# Patient Record
Sex: FEMALE | Race: WHITE | NOT HISPANIC OR LATINO | URBAN - METROPOLITAN AREA
[De-identification: names, ages, dates, MRNs, and addresses within clinical notes are randomized per-mention and may not be internally consistent; named-entity substitution may affect disease eponyms.]

---

## 2019-04-04 ENCOUNTER — EMERGENCY (EMERGENCY)
Facility: HOSPITAL | Age: 49
LOS: 1 days | Discharge: HOME | End: 2019-04-04
Attending: EMERGENCY MEDICINE
Payer: COMMERCIAL

## 2019-04-04 VITALS
HEART RATE: 68 BPM | OXYGEN SATURATION: 99 % | RESPIRATION RATE: 18 BRPM | WEIGHT: 132.94 LBS | HEIGHT: 66 IN | TEMPERATURE: 98 F

## 2019-04-04 LAB
ALBUMIN SERPL ELPH-MCNC: 4.8 G/DL — SIGNIFICANT CHANGE UP (ref 3.3–5)
ALP SERPL-CCNC: 78 U/L — SIGNIFICANT CHANGE UP (ref 40–120)
ALT FLD-CCNC: 16 U/L — SIGNIFICANT CHANGE UP (ref 10–45)
ANION GAP SERPL CALC-SCNC: 12 MMOL/L — SIGNIFICANT CHANGE UP (ref 5–17)
AST SERPL-CCNC: 19 U/L — SIGNIFICANT CHANGE UP (ref 10–40)
BASOPHILS # BLD AUTO: 0.1 K/UL — SIGNIFICANT CHANGE UP (ref 0–0.2)
BASOPHILS NFR BLD AUTO: 1 % — SIGNIFICANT CHANGE UP (ref 0–2)
BILIRUB SERPL-MCNC: 0.9 MG/DL — SIGNIFICANT CHANGE UP (ref 0.2–1.2)
BUN SERPL-MCNC: 11 MG/DL — SIGNIFICANT CHANGE UP (ref 7–23)
CALCIUM SERPL-MCNC: 10.1 MG/DL — SIGNIFICANT CHANGE UP (ref 8.4–10.5)
CHLORIDE SERPL-SCNC: 101 MMOL/L — SIGNIFICANT CHANGE UP (ref 96–108)
CO2 SERPL-SCNC: 29 MMOL/L — SIGNIFICANT CHANGE UP (ref 22–31)
CREAT SERPL-MCNC: 0.76 MG/DL — SIGNIFICANT CHANGE UP (ref 0.5–1.3)
EOSINOPHIL # BLD AUTO: 0.1 K/UL — SIGNIFICANT CHANGE UP (ref 0–0.5)
EOSINOPHIL NFR BLD AUTO: 1.7 % — SIGNIFICANT CHANGE UP (ref 0–6)
GLUCOSE SERPL-MCNC: 110 MG/DL — HIGH (ref 70–99)
HCG SERPL-ACNC: 5.4 MIU/ML — HIGH
HCT VFR BLD CALC: 47.1 % — HIGH (ref 34.5–45)
HGB BLD-MCNC: 15.5 G/DL — SIGNIFICANT CHANGE UP (ref 11.5–15.5)
LYMPHOCYTES # BLD AUTO: 2.6 K/UL — SIGNIFICANT CHANGE UP (ref 1–3.3)
LYMPHOCYTES # BLD AUTO: 43.3 % — SIGNIFICANT CHANGE UP (ref 13–44)
MCHC RBC-ENTMCNC: 29.1 PG — SIGNIFICANT CHANGE UP (ref 27–34)
MCHC RBC-ENTMCNC: 32.9 GM/DL — SIGNIFICANT CHANGE UP (ref 32–36)
MCV RBC AUTO: 88.4 FL — SIGNIFICANT CHANGE UP (ref 80–100)
MONOCYTES # BLD AUTO: 0.4 K/UL — SIGNIFICANT CHANGE UP (ref 0–0.9)
MONOCYTES NFR BLD AUTO: 7.2 % — SIGNIFICANT CHANGE UP (ref 2–14)
NEUTROPHILS # BLD AUTO: 2.8 K/UL — SIGNIFICANT CHANGE UP (ref 1.8–7.4)
NEUTROPHILS NFR BLD AUTO: 46.8 % — SIGNIFICANT CHANGE UP (ref 43–77)
PLATELET # BLD AUTO: 181 K/UL — SIGNIFICANT CHANGE UP (ref 150–400)
POTASSIUM SERPL-MCNC: 4.4 MMOL/L — SIGNIFICANT CHANGE UP (ref 3.5–5.3)
POTASSIUM SERPL-SCNC: 4.4 MMOL/L — SIGNIFICANT CHANGE UP (ref 3.5–5.3)
PROT SERPL-MCNC: 7.1 G/DL — SIGNIFICANT CHANGE UP (ref 6–8.3)
RBC # BLD: 5.32 M/UL — HIGH (ref 3.8–5.2)
RBC # FLD: 12 % — SIGNIFICANT CHANGE UP (ref 10.3–14.5)
SODIUM SERPL-SCNC: 142 MMOL/L — SIGNIFICANT CHANGE UP (ref 135–145)
WBC # BLD: 6 K/UL — SIGNIFICANT CHANGE UP (ref 3.8–10.5)
WBC # FLD AUTO: 6 K/UL — SIGNIFICANT CHANGE UP (ref 3.8–10.5)

## 2019-04-04 PROCEDURE — 99284 EMERGENCY DEPT VISIT MOD MDM: CPT

## 2019-04-04 PROCEDURE — 99243 OFF/OP CNSLTJ NEW/EST LOW 30: CPT

## 2019-04-04 RX ORDER — KETOROLAC TROMETHAMINE 30 MG/ML
15 SYRINGE (ML) INJECTION ONCE
Qty: 0 | Refills: 0 | Status: DISCONTINUED | OUTPATIENT
Start: 2019-04-04 | End: 2019-04-04

## 2019-04-04 RX ORDER — SODIUM CHLORIDE 9 MG/ML
1000 INJECTION, SOLUTION INTRAVENOUS ONCE
Qty: 0 | Refills: 0 | Status: COMPLETED | OUTPATIENT
Start: 2019-04-04 | End: 2019-04-04

## 2019-04-04 RX ADMIN — SODIUM CHLORIDE 1000 MILLILITER(S): 9 INJECTION, SOLUTION INTRAVENOUS at 22:58

## 2019-04-04 NOTE — ED PROVIDER NOTE - ATTENDING CONTRIBUTION TO CARE
Afebrile. Awake and Alert. Lungs CTA. Heart RRR. Abdomen soft NTND. CN II-XII grossly intact. Moves all extremities without lateralization.    r/o appy: CT a/p, although symptoms are intermittent and abdominal exam is benin  r/o constipation: Has not had a BM in several days, following with GI  r/o endometriosis: Sx similar, pt stopped progesterone several weeks ago  r/o UTI: UA

## 2019-04-04 NOTE — ED PROVIDER NOTE - NSFOLLOWUPINSTRUCTIONS_ED_ALL_ED_FT
1) Please follow-up with an OBGYN doctor about your endometriosis pain. We also recommend you followup with a GI doctor about your constipation (clinic referral information attached).  If you cannot follow-up with your doctor(s), please return to the ED for any urgent issues.  2) If you have any worsening of symptoms or any other concerns please return to the ED immediately.  3) Please continue taking your home medications as directed.  4) You may have been given a copy of your labs and/or imaging.  Please go over these with your primary care doctor.  5) Take 600mg Motrin (also called Advil or Ibuprofen) every 6 hours as needed for fever/pain/discomfort/swelling. You can take this with Tylenol 650 mg (also called acetaminophen) every 6 hours.   Don't use more than 3500mg of Tylenol in any 24-hour period. Make sure your other prescription/over-the-counter medications don't contain any Tylenol so you don't take too much.   If you have any stomach discomfort while taking Motrin, you can use TUMS or Pepcid or Zantac (these can all be bought without a prescription).

## 2019-04-04 NOTE — ED PROVIDER NOTE - PROGRESS NOTE DETAILS
Jeremy PGY2: discussed with pt c/f HCG>4 r/b/a discussed of ctap, pt would prefer to get ctap does not believe pregnant informed to f/u w pmd for repeated beta in 48 hours, pt agrees to ctap. ATTG: : patient endorsed to me by Dr. Waldron, awaiting OB. patient wants to leave but I asked her to wait a little longer for the team to complete their evaluation. ATTG: : Patient evaluated by Ob team. no surgical intervention. rec dc home with close follow up. Pain controlled and patient wishes to go home. provided with copy of results and instructions to return if worsening / persistent symptoms.

## 2019-04-04 NOTE — ED ADULT NURSE NOTE - ED STAT RN HANDOFF DETAILS
Handoff report given to Mar RING. Understands pmh, medications given and plan of care for patient. Patient in stable condition, vital signs updated, pt verbalizes wanting to go home and has been updated on care plan. Explained to patient that it is change of shift and new nurse is taking over, pt verbalized understanding. Handoff report given to Susie RING. Understands pmh, medications given and plan of care for patient. Patient in stable condition, vital signs updated, has no complaints at this time and has been updated on care plan. Explained to patient that it is change of shift and new nurse is taking over, pt verbalized understanding.

## 2019-04-04 NOTE — ED ADULT NURSE NOTE - NSIMPLEMENTINTERV_GEN_ALL_ED
Implemented All Universal Safety Interventions:  [place] to call system. Call bell, personal items and telephone within reach. Instruct patient to call for assistance. Room bathroom lighting operational. Non-slip footwear when patient is off stretcher. Physically safe environment: no spills, clutter or unnecessary equipment. Stretcher in lowest position, wheels locked, appropriate side rails in place.

## 2019-04-04 NOTE — ED PROVIDER NOTE - CLINICAL SUMMARY MEDICAL DECISION MAKING FREE TEXT BOX
Jeremy PGY2: 48F hx of endometriosis currently not on progesterone x1 month, IBS, presents with a cc of RLQ pain worsening x2 weeks, seen by GI today w/ c/f constipation told if RLQ get worse present to ED for eval, prompting visit, RLQ pain gradually improving over last x2 weeks however got worse today, + nausea, no vomiting, no pain meds prior to ED arrival, feels worse than prior endometriosis pain, psh of multiple hernia repairs R/L sided, no hernia now exam vss well appearing non toxic mild RLQ pain c/f appy v colitis vs endometriosis vs ovarian cyst will get labs ua ctap ivf reassess

## 2019-04-04 NOTE — ED ADULT NURSE NOTE - OBJECTIVE STATEMENT
48y female with hx of endometriosis, ovarian cysts, and multiple hernias (femoral and inguinal) presents to the ER c/o abdominal pain. pt is alert and oriented x 4 and speaking coherently. Pt states for two weeks she has had constant, 8/10 , stabbing RLQ abdominal pain. pt went to her GI doc today due to 3 days of constipation. Pt was told that she needed to come to the ER for possible Appendicitis. Pt appears calm, significant other at the bedside. Pt denies cp, sob. pt states she feels nauseous and chills periodically. Pt VSS. pt in nad. MD calderon completed. Will reassess.

## 2019-04-04 NOTE — ED PROVIDER NOTE - OBJECTIVE STATEMENT
48F hx of endometriosis currently not on progesterone x1 month, IBS, presents with a cc of RLQ pain worsening x2 weeks, ovarian cysts, seen by GI today w/ c/f constipation told if RLQ get worse present to ED for eval, prompting visit, RLQ pain gradually improving over last x2 weeks however got worse today, + nausea, no vomiting, no pain meds prior to ED arrival, feels worse than prior endometriosis pain, psh of multiple hernia repairs R/L sided, no hernia now. Has not had bowel movement in x3 days, is still passing gas. Denies /v/f/c/cp/sob. Denies headache, syncope, lightheadedness, dizziness. Denies chest palpitations, Denies dysuria, hematuria, hematochezia, BRBPR, tarry stools, diarrhea.

## 2019-04-04 NOTE — ED PROVIDER NOTE - PHYSICAL EXAMINATION
GEN APPEARANCE: WDWN, alert and cooperative, non-toxic appearing and in NAD  HEAD: Atraumatic, normocephalic   EYES: PERRLa, EOMI, vision grossly intact.   EARS: Gross hearing intact.   NOSE: No nasal discharge, no external evidence of epistaxis.   NECK: Supple  CV: RRR, S1S2, no c/r/m/g. No cyanosis or pallor. Extremities warm, well perfused. Cap refill <2 seconds. No bruits.   LUNGS: CTAB. No wheezing. No rales. No rhonchi. No diminished breath sounds.   ABDOMEN: mild RLQ ttp on deep palpation. No guarding or rebound. No masses.   MSK: Spine appears normal, no spine point tenderness. No CVA ttp. No joint erythema or tenderness. Normal muscular development. Pelvis stable.  EXTREMITIES: No peripheral edema. No obvious joint or bony deformity.  NEURO: Alert, follows commands. Weight bearing normal. Speech normal. Sensation and motor normal x4 extremities.   SKIN: Normal color for race, warm, dry and intact. No evidence of rash.  PSYCH: Normal mood and affect.

## 2019-04-04 NOTE — ED PROVIDER NOTE - NSFOLLOWUPCLINICS_GEN_ALL_ED_FT
[facility]  Gastroenterology  [address] [place] [id number]  Phone: [phone]  Fax:   Follow Up Time:

## 2019-04-05 VITALS
RESPIRATION RATE: 16 BRPM | TEMPERATURE: 98 F | OXYGEN SATURATION: 100 % | DIASTOLIC BLOOD PRESSURE: 62 MMHG | SYSTOLIC BLOOD PRESSURE: 108 MMHG | HEART RATE: 62 BPM

## 2019-04-05 DIAGNOSIS — N80.9 ENDOMETRIOSIS, UNSPECIFIED: ICD-10-CM

## 2019-04-05 LAB
APPEARANCE UR: CLEAR — SIGNIFICANT CHANGE UP
BILIRUB UR-MCNC: NEGATIVE — SIGNIFICANT CHANGE UP
COLOR SPEC: COLORLESS — SIGNIFICANT CHANGE UP
CULTURE RESULTS: SIGNIFICANT CHANGE UP
DIFF PNL FLD: NEGATIVE — SIGNIFICANT CHANGE UP
GLUCOSE UR QL: NEGATIVE — SIGNIFICANT CHANGE UP
KETONES UR-MCNC: NEGATIVE — SIGNIFICANT CHANGE UP
LEUKOCYTE ESTERASE UR-ACNC: NEGATIVE — SIGNIFICANT CHANGE UP
NITRITE UR-MCNC: NEGATIVE — SIGNIFICANT CHANGE UP
PH UR: 7.5 — SIGNIFICANT CHANGE UP (ref 5–8)
PROT UR-MCNC: NEGATIVE — SIGNIFICANT CHANGE UP
SP GR SPEC: 1 — LOW (ref 1.01–1.02)
SPECIMEN SOURCE: SIGNIFICANT CHANGE UP
UROBILINOGEN FLD QL: NEGATIVE — SIGNIFICANT CHANGE UP

## 2019-04-05 PROCEDURE — 87086 URINE CULTURE/COLONY COUNT: CPT

## 2019-04-05 PROCEDURE — 80053 COMPREHEN METABOLIC PANEL: CPT

## 2019-04-05 PROCEDURE — 74177 CT ABD & PELVIS W/CONTRAST: CPT

## 2019-04-05 PROCEDURE — 85027 COMPLETE CBC AUTOMATED: CPT

## 2019-04-05 PROCEDURE — 99284 EMERGENCY DEPT VISIT MOD MDM: CPT | Mod: 25

## 2019-04-05 PROCEDURE — 76830 TRANSVAGINAL US NON-OB: CPT

## 2019-04-05 PROCEDURE — 74177 CT ABD & PELVIS W/CONTRAST: CPT | Mod: 26

## 2019-04-05 PROCEDURE — 93975 VASCULAR STUDY: CPT | Mod: 26

## 2019-04-05 PROCEDURE — 84702 CHORIONIC GONADOTROPIN TEST: CPT

## 2019-04-05 PROCEDURE — 93975 VASCULAR STUDY: CPT

## 2019-04-05 PROCEDURE — 76830 TRANSVAGINAL US NON-OB: CPT | Mod: 26

## 2019-04-05 PROCEDURE — 81003 URINALYSIS AUTO W/O SCOPE: CPT

## 2019-04-05 NOTE — CONSULT NOTE ADULT - ASSESSMENT
47 y/o G0 presents with RLQ pain and TVUS c/w ovarian cysts.  Chronic per patient.  Likely endometriosis given patients history.  Differential for pain includes endometriosis vs. constipation.   CT abd/pelvis otherwise with no etiology for pain.  Patient stable w/o surgical abdomen.     Discussed with patient endometriosis and need for outpatient consultation to discuss surgical vs. medical options for treatment.   No indication for emergent intervention at this time.   Patient agreeable with that plan.      Elevated b-hcg of 5.4 unlikely pregnancy in this post-menopausal patient.  More likely pituitary hcg. 47 y/o G0 presents with RLQ pain and TVUS c/w ovarian cysts.  Chronic per patient.  Likely endometriosis given patients history.  Differential for pain includes endometriosis vs. constipation.   CT abd/pelvis otherwise with no etiology for pain.  Patient stable w/o surgical abdomen.     Discussed with patient endometriosis and need for outpatient consultation to discuss options for treatment.   Discussed that differential for ovarian cysts in post-menopausal patient can include simple physiologic cysts vs. endometrioma vs. malignancy.  Given septation in cysts unable to exclude malignancy at this time.  Explained to patient no indication for emergent intervention at this time however requires outpatient follow up.   Patient agreeable with that plan.      Elevated b-hcg of 5.4 unlikely pregnancy in this post-menopausal patient.  More likely pituitary hcg.

## 2019-04-05 NOTE — CONSULT NOTE ADULT - SUBJECTIVE AND OBJECTIVE BOX
SHIN ABEL  48y  Female [id number]    HPI:  47 y/o G0 post-menopausal female (LMP 2018) here w/ c/o RLQ pain.  Patient w/ history of endometriosis diagnosed via LSC L ovarian cystectomy in  by outpatient gynecologist.  Patient reports history of years of pelvic pain.  She has been to see multiple gynecologists and outpatient "pain" providers.   Currently is on a regimen prescribed by an outpatient alternative  medicine doctor of Fish oil, end oil, Vit D and magnesium w/ little relief of pain.  States at one point she was on a form of progesterone that helped with pain but she stopped it.    She reports she has known she has ovarian cysts for the past year.   She has chronic unremitting pain.  Over the past three weeks her pain has slowly worsened.  She also suffers from IBS.  She has been constipated over the past few days and went to see her GI doctor who told her if her pain worsens she should come to the ED.   Last night she developed some chills, no fever, so decided to come in.   She has not had a bowel movement in three days.  She has an apetitie.      Review of Systems:   Patient denies headaches, blurry vision, lightheadedness' dizziness, CP, SOB, fevers, nausea, vomiting, diarrhe.     Name of GYN Physician: Dr. Jones in [place]     POB:  G0     Pgyn: Reports history of ovarian cysts as above.  Denies fibroids, STI's, Abnormal pap smears     Home meds:  Fish oil, Endol, Vit D, Mg     Allergies    No Known Allergies    Intolerances    PAST MEDICAL & SURGICAL HISTORY:  IBS (irritable bowel syndrome)  Endometriosis  Inguinal hernia x 3   LSC L ovarian cystectomy      FAMILY HISTORY:  Family history of colon cancer (Mother)  Family history of lung cancer (Father)      Social History:  Denies smoking use, drug use, alcohol use.   Works in fashion industry     Vital Signs Last 24 Hrs  T(C): 36.4 (2019 07:47), Max: 36.6 (2019 21:06)  T(F): 97.6 (2019 07:47), Max: 97.8 (2019 21:06)  HR: 62 (2019 07:47) (62 - 80)  BP: 108/62 (2019 07:47) (108/62 - 132/77)  BP(mean): 86 (2019 06:35) (86 - 86)  RR: 16 (2019 07:47) (16 - 18)  SpO2: 100% (2019 07:47) (98% - 100%)    Physical Exam:   General: sitting comfortably in bed, NAD   HEENT: neck supple, full ROM  CV: RR S1S2 no m/r/g  Lungs: CTA b/l, good air flow b/l   Back: No CVA tenderness  Abd: Soft, non-tender, non-distended.  Bowel sounds present.    :  No bleeding on pad.    External labia wnl.  Bimanual exam with no cervical motion tenderness, uterus wnl, adnexa non palpable b/l.   Speculum Exam: No active bleeding from os.  Physiologic discharge.    Ext: non-tender b/l, no edema     LABS:                              15.5   6.0   )-----------( 181      ( 2019 23:02 )             47.1     04-    142  |  101  |  11  ----------------------------<  110<H>  4.4   |  29  |  0.76    Ca    10.1      2019 23:02    TPro  7.1  /  Alb  4.8  /  TBili  0.9  /  DBili  x   /  AST  19  /  ALT  16  /  AlkPhos  78  04-04    I&O's Detail      Urinalysis Basic - ( 2019 01:08 )    Color: Colorless / Appearance: Clear / S.005 / pH: x  Gluc: x / Ketone: Negative  / Bili: Negative / Urobili: Negative   Blood: x / Protein: Negative / Nitrite: Negative   Leuk Esterase: Negative / RBC: x / WBC x   Sq Epi: x / Non Sq Epi: x / Bacteria: x        RADIOLOGY & ADDITIONAL STUDIES:    EXAM:  CT ABDOMEN AND PELVIS IC                            PROCEDURE DATE:  2019            INTERPRETATION:  CLINICAL INFORMATION: Right lower quadrant abdominal   pain for 2 weeks. Nausea.      COMPARISON: CT abdomen pelvis from 2015.    PROCEDURE:   CT of the Abdomen and Pelvis was performed with intravenous contrast.   Intravenous contrast: 90 ml Omnipaque 350. 10 ml discarded.  Oral contrast: None.  Sagittal and coronal reformats were performed.    FINDINGS:    LOWER CHEST: Mild bibasilar dependent atelectasis.    LIVER: Several scattered tiny subcentimeter hypodense lesions that are   too small to characterize.  BILE DUCTS: Normal caliber.  GALLBLADDER: Within normal limits.  SPLEEN: Within normal limits.  PANCREAS: Within normal limits.  ADRENALS: Within normal limits.  KIDNEYS/URETERS: Within normal limits.    BLADDER: Within normal limits.  REPRODUCTIVE ORGANS: Uterus is unremarkable. Left ovarian cysts, largest   measuring up to 5.8 x 3.4 x 5.1 cm. A right ovarian cyst measures up to   4.6 x 3.6 x 5.7 cm.    BOWEL: No bowel obstruction or bowel wall thickening.. Appendix is normal.  PERITONEUM: No ascites, pneumoperitoneum, or loculated collection. No   mesenteric adenopathy.  VESSELS:  Within normal limits.  RETROPERITONEUM: No lymphadenopathy.    ABDOMINAL WALL: Post left inguinal hernia repair.  BONES: Mild degenerative changes of the spine.    IMPRESSION:     No bowel obstruction or evidence of bowel inflammation. Normal appendix.    Bilateral ovarian cysts measuring up to 5.8 cm on the left and 5.7 cm on   the right. Pelvic sonogram should be considered, as clinically indicated.                    TAMMIE BUSTILLOS M.D., RADIOLOGY RESIDENT  This document has been electronically signed.  DINAH LAIRD D.O.; ATTENDING RADIOLOGIST  This document has been electronically signed. 2019  3:18AM              EXAM:  US TRANSVAGINAL                          EXAM:  US DPLX PELVIC                            PROCEDURE DATE:  2019            INTERPRETATION:  CLINICAL INFORMATION: Right lower quadrant pain.   Bilateral ovarian cysts noted on the CT.    LMP: Postmenopausal patient.    COMPARISON: CT abdomen and pelvis from earlier the same day.    TECHNIQUE: Endovaginal pelvic sonogram only. Color and Spectral Doppler   was performed.     FINDINGS:    Uterus: 5.6 x 3.0 x 3.7 cm. Within normal limits.    Endometrium: 6 mm. Within normal limits.    Right ovary: 6.0 x 3.6 x 3.8 cm with a septated cyst measuring 5.3 x 2.8   x 3.2 cm. No vascularity noted within the septum. Normal arterial and   venous waveforms.    Left ovary: 6.1 x 3.9 x 5.6 cm with a septated cyst measuring 4.7 x 2.9 x   4.9 cm. No vascularity noted within the septum. Normal arterial and   venous waveforms.    Fluid: None.    IMPRESSION:    Moderate-sized bilateral ovarian septated cysts.                TAMMIE BUSTILLOS M.D., RADIOLOGY RESIDENT  This document has been electronically signed.  HUMA GRANT M.D., ATTENDING RADIOLOGIST  This document has been electronically signed. 2019  5:47AM

## 2019-04-05 NOTE — CONSULT NOTE ADULT - PROBLEM SELECTOR RECOMMENDATION 9
-no acute GYN intervention at this time  -Given names of endometriosis specialists in system for referral for consultation outpatient.   -Recommend f/u w/ GI for constipation   -Recommend NSAID's for treatment of pain related to endometriosis   -Patient can have follow up b-hcg w/ outpatient OBGYN  -All questions/concerns of patient addressed     Kassandra Burdick PGY-3   d/w Dr. Mao -no acute GYN intervention at this time  -Given names of endometriosis specialists in system for referral for consultation outpatient.   -Recommend f/u w/ GI for constipation   -Recommend NSAID's for treatment of pain related to endometriosis   -Patient can have follow up b-hcg w/ outpatient OBGYN  -All questions/concerns of patient addressed     Kassandra Burdick PGY-3   Corinne seen and d/w Dr. Mao

## 2019-04-05 NOTE — CONSULT NOTE ADULT - ATTENDING COMMENTS
Pt seen by gyn resident.  Pt reports long hx pelvic pain and endometriosis.  She presented to ER w slight inc in pain and concern for appy due to location of pain.  Normal appendix visualized on CT.  Pt w b/l complex adnexal masses.    Abdomen is nonacute  Explained pt that b/l complex adnexal masses may be related to her long hx endometriosis however malignancy cannot be excluded.  Recommend outpt f/u.  Explained that pt will likely need BSO.  Pt reports that her previous MD recommended BSO and she refuses.  She was given contact info to f/u w Gyn.  Will need to repeat HCG as outpt

## 2020-10-14 NOTE — ED ADULT NURSE REASSESSMENT NOTE - NS ED NURSE REASSESS COMMENT FT1
Pt transported to patient examination room for OBGYN to evaluate patient. Pt in hospital gown and updated on pt plan of care. While waiting for pt to be evaluated by OBGYN, pt states "I want to go home," pt endorsing she has been in ED for multiple hours and no longer wants to wait hours longer to be seen by OBGYN consultation. PT educated on risks of leaving against medical advice. MD Martinez aware and OBGYN states that they will be down to ED to evaluate pt. Pt placed in position of comfort. Pt educated on call bell system and provided call bell. Bed in lowest position, wheels locked, appropriate side rails raised. Pt denies needs at this time.
Report received from Pee RN. AOx3, speaking in complete sentences. Lung sounds CTA, NAD. Abdomen soft, non-tender, non-distended, normoactive bowel sounds in all 4 quadrants. Pt awaiting OBGYN consultation at this time. Pt aware of plan of care.
157.48

## 2024-04-25 NOTE — CONSULT NOTE ADULT - PROBLEM SELECTOR PROBLEM 1
Endometriosis
Show Applicator Variable?: Yes
Detail Level: Detailed
Consent: The patient's consent was obtained including but not limited to risks of crusting, scabbing, blistering, scarring, darker or lighter pigmentary change, recurrence, incomplete removal and infection.
Post-Care Instructions: I reviewed with the patient in detail post-care instructions. Patient is to wear sunprotection, and avoid picking at any of the treated lesions. Pt may apply Vaseline to crusted or scabbing areas.
Render Post-Care Instructions In Note?: no
Duration Of Freeze Thaw-Cycle (Seconds): 0